# Patient Record
Sex: FEMALE | URBAN - METROPOLITAN AREA
[De-identification: names, ages, dates, MRNs, and addresses within clinical notes are randomized per-mention and may not be internally consistent; named-entity substitution may affect disease eponyms.]

---

## 2020-10-15 ENCOUNTER — TELEPHONE ENCOUNTER (OUTPATIENT)
Dept: URBAN - METROPOLITAN AREA CLINIC 92 | Facility: CLINIC | Age: 70
End: 2020-10-15

## 2020-10-16 ENCOUNTER — TELEPHONE ENCOUNTER (OUTPATIENT)
Dept: URBAN - METROPOLITAN AREA CLINIC 92 | Facility: CLINIC | Age: 70
End: 2020-10-16

## 2020-11-06 ENCOUNTER — OFFICE VISIT (OUTPATIENT)
Dept: URBAN - METROPOLITAN AREA TELEHEALTH 2 | Facility: TELEHEALTH | Age: 70
End: 2020-11-06
Payer: MEDICARE

## 2020-11-06 DIAGNOSIS — D50.8 ANEMIA, DUE TO INADEQUATE IRON INTAKE: ICD-10-CM

## 2020-11-06 DIAGNOSIS — R14.0 BLOATING: ICD-10-CM

## 2020-11-06 DIAGNOSIS — D50.9 IRON DEFICIENCY ANEMIA, UNSPECIFIED IRON DEFICIENCY ANEMIA TYPE: ICD-10-CM

## 2020-11-06 PROCEDURE — 99443 PHONE E/M BY PHYS 21-30 MIN: CPT | Performed by: PHYSICIAN ASSISTANT

## 2020-11-06 RX ORDER — FAMOTIDINE/CA CARB/MAG HYDROX 10-800-165
TABLET,CHEWABLE ORAL
Qty: 0 | Refills: 0 | Status: ACTIVE | COMMUNITY
Start: 1900-01-01

## 2020-11-06 RX ORDER — POLYETHYLENE GLYCOL 400 AND PROPYLENE GLYCOL 4; 3 MG/ML; MG/ML
SOLUTION/ DROPS OPHTHALMIC
Qty: 1 | Refills: 0 | Status: ACTIVE | COMMUNITY
Start: 1900-01-01

## 2020-11-06 RX ORDER — GLUCOSAMINE SULFATE 500 MG
TABLET ORAL
Qty: 0 | Refills: 0 | Status: ACTIVE | COMMUNITY
Start: 1900-01-01

## 2020-11-06 RX ORDER — MELOXICAM 7.5 MG/1
TABLET ORAL
Qty: 0 | Refills: 0 | Status: ACTIVE | COMMUNITY
Start: 1900-01-01

## 2020-11-06 RX ORDER — SODIUM, POTASSIUM,MAG SULFATES 17.5-3.13G
177ML SOLUTION, RECONSTITUTED, ORAL ORAL
Qty: 1 | Refills: 0 | OUTPATIENT
Start: 2020-11-06

## 2020-11-06 RX ORDER — ONDANSETRON HYDROCHLORIDE 8 MG/1
1 CAPSULE TABLET, FILM COATED ORAL
Qty: 3 CAPSULE | Refills: 0 | OUTPATIENT
Start: 2020-11-06 | End: 2020-11-07

## 2020-11-06 RX ORDER — CIPROFLOXACIN HCL 250 MG
TABLET ORAL
Qty: 0 | Refills: 0 | Status: ACTIVE | COMMUNITY
Start: 1900-01-01

## 2020-11-06 RX ORDER — ONDANSETRON 4 MG/1
DISSOLVE  2 TABLETS BY ORAL ROUTE ONCE FOR 1 DAY TABLET, ORALLY DISINTEGRATING ORAL 1
Qty: 3 | Refills: 0 | Status: ACTIVE | COMMUNITY
Start: 2017-07-31

## 2020-11-06 NOTE — HPI-OTHER HISTORIES
69yoF last seen 9/2019 for eval of bloating X several months now. The bloating occurs after eating and has  improved with align, but still present and "frustrating". Also improved with FODMAP. She has BM daily without hematochezia or melena. She has been having frequent BMs 6+day X years, neg microscopic colitis in the past.  No NSAIDs. No hearturn, regurg or dysphagia.  Pelvic u/s last year WNL Colonoscopy 2018 with one hyperplastic polyp EGD 9/2019 1cm HH gastritis without HP and small benign gastric polyp. celiac neg in the past Recently saw PCP and told she has SHILPA: ferritin 9.6, Fe+ 31 H/H 10.8/35.7

## 2020-12-22 ENCOUNTER — TELEPHONE ENCOUNTER (OUTPATIENT)
Dept: URBAN - METROPOLITAN AREA CLINIC 92 | Facility: CLINIC | Age: 70
End: 2020-12-22

## 2020-12-23 ENCOUNTER — OFFICE VISIT (OUTPATIENT)
Dept: URBAN - METROPOLITAN AREA TELEHEALTH 2 | Facility: TELEHEALTH | Age: 70
End: 2020-12-23
Payer: MEDICARE

## 2020-12-23 DIAGNOSIS — R14.0 BLOATING: ICD-10-CM

## 2020-12-23 DIAGNOSIS — D50.9 IRON DEFICIENCY ANEMIA, UNSPECIFIED IRON DEFICIENCY ANEMIA TYPE: ICD-10-CM

## 2020-12-23 DIAGNOSIS — D50.8 ANEMIA, DUE TO INADEQUATE IRON INTAKE: ICD-10-CM

## 2020-12-23 PROCEDURE — 99442 PHONE E/M BY PHYS 11-20 MIN: CPT | Performed by: PHYSICIAN ASSISTANT

## 2020-12-23 RX ORDER — FERROUS SULFATE TAB EC 325 MG (65 MG FE EQUIVALENT) 325 (65 FE) MG
1 TABLET TABLET DELAYED RESPONSE ORAL ONCE A DAY
Qty: 90 | Refills: 3 | OUTPATIENT
Start: 2020-12-23

## 2020-12-23 RX ORDER — GLUCOSAMINE SULFATE 500 MG
TABLET ORAL
Qty: 0 | Refills: 0 | Status: ACTIVE | COMMUNITY
Start: 1900-01-01

## 2020-12-23 RX ORDER — ONDANSETRON 4 MG/1
DISSOLVE  2 TABLETS BY ORAL ROUTE ONCE FOR 1 DAY TABLET, ORALLY DISINTEGRATING ORAL 1
Qty: 3 | Refills: 0 | Status: ACTIVE | COMMUNITY
Start: 2017-07-31

## 2020-12-23 RX ORDER — MELOXICAM 7.5 MG/1
TABLET ORAL
Qty: 0 | Refills: 0 | COMMUNITY
Start: 1900-01-01

## 2020-12-23 RX ORDER — CIPROFLOXACIN HCL 250 MG
TABLET ORAL
Qty: 0 | Refills: 0 | Status: ACTIVE | COMMUNITY
Start: 1900-01-01

## 2020-12-23 RX ORDER — FAMOTIDINE/CA CARB/MAG HYDROX 10-800-165
TABLET,CHEWABLE ORAL
Qty: 0 | Refills: 0 | Status: ACTIVE | COMMUNITY
Start: 1900-01-01

## 2020-12-23 RX ORDER — POLYETHYLENE GLYCOL 400 AND PROPYLENE GLYCOL 4; 3 MG/ML; MG/ML
SOLUTION/ DROPS OPHTHALMIC
Qty: 1 | Refills: 0 | Status: ACTIVE | COMMUNITY
Start: 1900-01-01

## 2020-12-23 NOTE — HPI-OTHER HISTORIES
70yoF seen in 2019 for eval of bloating X several months and more recently for SHILPA. Recently saw PCP in Oct and told she has SHILPA: Labs in Oct ferritin 9.6, Fe+ 31 H/H 10.8/35.7. Repeat 12/2020 Hemoglobin 11.1, Ferritin 8. Not on Fe+.  Cotinues to note bloating, worse after eating and has improved with align, but still present and "frustrating". Also improved with FODMAP. She has BM daily without hematochezia or melena. Her baseline is actually BMs 6+day X years, neg microscopic colitis in the past. She is going to do SIBO test at time of her procedures.  No NSAIDs. No hearturn, regurg or dysphagia.  Pelvic u/s last year WNL Colonoscopy 2018 with one hyperplastic polyp EGD 9/2019 1cm HH gastritis without HP and small benign gastric polyp. celiac neg in the past  Colon/EGD scheduled for Feb

## 2021-01-20 ENCOUNTER — TELEPHONE ENCOUNTER (OUTPATIENT)
Dept: URBAN - METROPOLITAN AREA CLINIC 92 | Facility: CLINIC | Age: 71
End: 2021-01-20

## 2021-02-19 ENCOUNTER — OFFICE VISIT (OUTPATIENT)
Dept: URBAN - METROPOLITAN AREA TELEHEALTH 2 | Facility: TELEHEALTH | Age: 71
End: 2021-02-19
Payer: MEDICARE

## 2021-02-19 DIAGNOSIS — D50.8 ANEMIA, DUE TO INADEQUATE IRON INTAKE: ICD-10-CM

## 2021-02-19 DIAGNOSIS — R14.0 BLOATING: ICD-10-CM

## 2021-02-19 PROCEDURE — 99442 PHONE E/M BY PHYS 11-20 MIN: CPT | Performed by: INTERNAL MEDICINE

## 2021-02-19 RX ORDER — GLUCOSAMINE SULFATE 500 MG
TABLET ORAL
Qty: 0 | Refills: 0 | COMMUNITY
Start: 1900-01-01

## 2021-02-19 RX ORDER — ONDANSETRON 4 MG/1
DISSOLVE  2 TABLETS BY ORAL ROUTE ONCE FOR 1 DAY TABLET, ORALLY DISINTEGRATING ORAL 1
Qty: 3 | Refills: 0 | COMMUNITY
Start: 2017-07-31

## 2021-02-19 RX ORDER — FAMOTIDINE/CA CARB/MAG HYDROX 10-800-165
TABLET,CHEWABLE ORAL
Qty: 0 | Refills: 0 | COMMUNITY
Start: 1900-01-01

## 2021-02-19 RX ORDER — POLYETHYLENE GLYCOL 400 AND PROPYLENE GLYCOL 4; 3 MG/ML; MG/ML
SOLUTION/ DROPS OPHTHALMIC
Qty: 1 | Refills: 0 | COMMUNITY
Start: 1900-01-01

## 2021-02-19 RX ORDER — MELOXICAM 7.5 MG/1
TABLET ORAL
Qty: 0 | Refills: 0 | COMMUNITY
Start: 1900-01-01

## 2021-02-19 RX ORDER — CIPROFLOXACIN HCL 250 MG
TABLET ORAL
Qty: 0 | Refills: 0 | COMMUNITY
Start: 1900-01-01

## 2021-02-19 RX ORDER — FERROUS SULFATE TAB EC 325 MG (65 MG FE EQUIVALENT) 325 (65 FE) MG
1 TABLET TABLET DELAYED RESPONSE ORAL ONCE A DAY
Qty: 90 | Refills: 3 | COMMUNITY
Start: 2020-12-23

## 2021-02-19 NOTE — HPI-OTHER HISTORIES
70yoF seen in 2019 for eval of bloating X several months and more recently for SHILPA.  Recently saw PCP in Oct and told she has SHILPA: Labs in Oct ferritin 9.6, Fe+ 31 H/H 10.8/35.7. Repeat 12/2020 Hemoglobin 11.1, Ferritin 8. Not on Fe+.      Cotinues to note bloating, worse after eating and has improved with align, but still present and "frustrating". Also improved with FODMAP. She has BM daily without hematochezia or melena. Her baseline is actually BMs 6+day X years, neg microscopic colitis in the past. Feels incomplete evacuation with bowels. She is going to do SIBO test at time of her procedures.  No NSAIDs. No hearturn, regurg or dysphagia.   Pelvic u/s last year WNL Colonoscopy 2018 with one hyperplastic polyp + IH EGD 9/2019 1cm HH gastritis without HP and small benign gastric polyp. celiac neg in the past Colon/EGD scheduled for next month--reviewed all her questions re prep and nausea--given zofran for pre-prep Brother with "blood vessels" that had to be cauterzied

## 2021-02-27 ENCOUNTER — ERX REFILL RESPONSE (OUTPATIENT)
Age: 71
End: 2021-02-27

## 2021-02-27 RX ORDER — ONDANSETRON 4 MG/1
PLEASE SPECIFY DIRECTIONS, REFILLS AND QUANTITY TABLET, ORALLY DISINTEGRATING ORAL
Qty: 1 TABLET | Refills: 0 | OUTPATIENT

## 2021-02-27 RX ORDER — ONDANSETRON 4 MG/1
TAKE 1 TABLET BY MOUTH AS NEEDED 1 HOURS BEFORE PREP TABLET, FILM COATED ORAL
Qty: 10 | Refills: 0 | OUTPATIENT

## 2021-03-04 ENCOUNTER — CLAIMS CREATED FROM THE CLAIM WINDOW (OUTPATIENT)
Dept: URBAN - METROPOLITAN AREA CLINIC 4 | Facility: CLINIC | Age: 71
End: 2021-03-04
Payer: MEDICARE

## 2021-03-04 ENCOUNTER — OFFICE VISIT (OUTPATIENT)
Dept: URBAN - METROPOLITAN AREA SURGERY CENTER 16 | Facility: SURGERY CENTER | Age: 71
End: 2021-03-04
Payer: MEDICARE

## 2021-03-04 DIAGNOSIS — K31.7 BENIGN GASTRIC POLYP: ICD-10-CM

## 2021-03-04 DIAGNOSIS — K31.7 POLYP OF STOMACH AND DUODENUM: ICD-10-CM

## 2021-03-04 DIAGNOSIS — D12.0 ADENOMA OF CECUM: ICD-10-CM

## 2021-03-04 DIAGNOSIS — D12.0 BENIGN NEOPLASM OF CECUM: ICD-10-CM

## 2021-03-04 DIAGNOSIS — D12.2 BENIGN NEOPLASM OF ASCENDING COLON: ICD-10-CM

## 2021-03-04 DIAGNOSIS — K31.89 DILATION OF STOMACH: ICD-10-CM

## 2021-03-04 DIAGNOSIS — D12.2 ADENOMA OF ASCENDING COLON: ICD-10-CM

## 2021-03-04 DIAGNOSIS — D50.9 ANEMIA, IRON DEFICIENCY: ICD-10-CM

## 2021-03-04 PROCEDURE — G8907 PT DOC NO EVENTS ON DISCHARG: HCPCS | Performed by: INTERNAL MEDICINE

## 2021-03-04 PROCEDURE — 45380 COLONOSCOPY AND BIOPSY: CPT | Performed by: INTERNAL MEDICINE

## 2021-03-04 PROCEDURE — 45385 COLONOSCOPY W/LESION REMOVAL: CPT | Performed by: INTERNAL MEDICINE

## 2021-03-04 PROCEDURE — 43239 EGD BIOPSY SINGLE/MULTIPLE: CPT | Performed by: INTERNAL MEDICINE

## 2021-03-04 PROCEDURE — 88305 TISSUE EXAM BY PATHOLOGIST: CPT | Performed by: PATHOLOGY

## 2021-03-04 PROCEDURE — 88312 SPECIAL STAINS GROUP 1: CPT | Performed by: PATHOLOGY

## 2021-03-04 RX ORDER — GLUCOSAMINE SULFATE 500 MG
TABLET ORAL
Qty: 0 | Refills: 0 | COMMUNITY
Start: 1900-01-01

## 2021-03-04 RX ORDER — CIPROFLOXACIN HCL 250 MG
TABLET ORAL
Qty: 0 | Refills: 0 | COMMUNITY
Start: 1900-01-01

## 2021-03-04 RX ORDER — FAMOTIDINE/CA CARB/MAG HYDROX 10-800-165
TABLET,CHEWABLE ORAL
Qty: 0 | Refills: 0 | COMMUNITY
Start: 1900-01-01

## 2021-03-04 RX ORDER — POLYETHYLENE GLYCOL 400 AND PROPYLENE GLYCOL 4; 3 MG/ML; MG/ML
SOLUTION/ DROPS OPHTHALMIC
Qty: 1 | Refills: 0 | COMMUNITY
Start: 1900-01-01

## 2021-03-04 RX ORDER — MELOXICAM 7.5 MG/1
TABLET ORAL
Qty: 0 | Refills: 0 | COMMUNITY
Start: 1900-01-01

## 2021-03-04 RX ORDER — FERROUS SULFATE TAB EC 325 MG (65 MG FE EQUIVALENT) 325 (65 FE) MG
1 TABLET TABLET DELAYED RESPONSE ORAL ONCE A DAY
Qty: 90 | Refills: 3 | COMMUNITY
Start: 2020-12-23

## 2021-03-04 RX ORDER — ONDANSETRON 4 MG/1
PLEASE SPECIFY DIRECTIONS, REFILLS AND QUANTITY TABLET, ORALLY DISINTEGRATING ORAL
Qty: 1 TABLET | Refills: 0 | Status: ACTIVE | COMMUNITY

## 2021-03-29 ENCOUNTER — TELEPHONE ENCOUNTER (OUTPATIENT)
Dept: URBAN - METROPOLITAN AREA CLINIC 92 | Facility: CLINIC | Age: 71
End: 2021-03-29

## 2021-04-01 ENCOUNTER — OFFICE VISIT (OUTPATIENT)
Dept: URBAN - METROPOLITAN AREA CLINIC 92 | Facility: CLINIC | Age: 71
End: 2021-04-01
Payer: MEDICARE

## 2021-04-01 VITALS — HEIGHT: 65 IN | BODY MASS INDEX: 26.33 KG/M2 | WEIGHT: 158 LBS

## 2021-04-01 DIAGNOSIS — R14.0 BLOATING: ICD-10-CM

## 2021-04-01 DIAGNOSIS — D50.9 IRON DEFICIENCY ANEMIA, UNSPECIFIED IRON DEFICIENCY ANEMIA TYPE: ICD-10-CM

## 2021-04-01 DIAGNOSIS — Z86.010 HISTORY OF COLON POLYPS: ICD-10-CM

## 2021-04-01 DIAGNOSIS — K57.90 DIVERTICULOSIS: ICD-10-CM

## 2021-04-01 PROCEDURE — 99442 PHONE E/M BY PHYS 11-20 MIN: CPT | Performed by: PHYSICIAN ASSISTANT

## 2021-04-01 RX ORDER — POLYETHYLENE GLYCOL 400 AND PROPYLENE GLYCOL 4; 3 MG/ML; MG/ML
SOLUTION/ DROPS OPHTHALMIC
Qty: 1 | Refills: 0 | COMMUNITY
Start: 1900-01-01

## 2021-04-01 RX ORDER — ONDANSETRON 4 MG/1
PLEASE SPECIFY DIRECTIONS, REFILLS AND QUANTITY TABLET, ORALLY DISINTEGRATING ORAL
Qty: 1 TABLET | Refills: 0 | Status: ACTIVE | COMMUNITY

## 2021-04-01 RX ORDER — CIPROFLOXACIN HCL 250 MG
TABLET ORAL
Qty: 0 | Refills: 0 | COMMUNITY
Start: 1900-01-01

## 2021-04-01 RX ORDER — FERROUS SULFATE TAB EC 325 MG (65 MG FE EQUIVALENT) 325 (65 FE) MG
1 TABLET TABLET DELAYED RESPONSE ORAL ONCE A DAY
Qty: 90 | Refills: 3 | COMMUNITY
Start: 2020-12-23

## 2021-04-01 RX ORDER — GLUCOSAMINE SULFATE 500 MG
TABLET ORAL
Qty: 0 | Refills: 0 | COMMUNITY
Start: 1900-01-01

## 2021-04-01 RX ORDER — FAMOTIDINE/CA CARB/MAG HYDROX 10-800-165
TABLET,CHEWABLE ORAL
Qty: 0 | Refills: 0 | COMMUNITY
Start: 1900-01-01

## 2021-04-01 RX ORDER — MELOXICAM 7.5 MG/1
TABLET ORAL
Qty: 0 | Refills: 0 | COMMUNITY
Start: 1900-01-01

## 2021-04-01 NOTE — HPI-OTHER HISTORIES
70yoF seen in 2019 for eval of bloating X several months and more recently for SHILPA.  Recently saw PCP in Oct and told she has SHILPA: Labs in Oct ferritin 9.6, Fe+ 31 H/H 10.8/35.7. Repeat 12/2020 Hemoglobin 11.1, Ferritin 8. Not on Fe+.        Cotinues to note bloating, worse after eating and has improved with align, but still present and "frustrating". Also improved with FODMAP. She has BM daily without hematochezia or melena. Her baseline is actually BMs 6+day X years, neg microscopic colitis in the past. Feels incomplete evacuation with bowels. No NSAIDs. No hearturn, regurg or dysphagia since being on FODMAP Pelvic u/s last year WNL Colonoscopy 2018 with one hyperplastic polyp + IH EGD 9/2019 1cm HH gastritis without HP and small benign gastric polyp. celiac neg in the past EGD 3/4/2021 3mm gastric fundic gland polyp and gastritis, bx neg HP and celiac Colon same day 3mm cecal adenoma and 3mm ascending colon adenoma as well as L sided diverticulosis and IH SIBO test pending She has repeat labs pending with PCP in 2 weeks Brother with "blood vessels" that had to be cauterzied

## 2021-04-27 ENCOUNTER — OFFICE VISIT (OUTPATIENT)
Dept: URBAN - METROPOLITAN AREA TELEHEALTH 2 | Facility: TELEHEALTH | Age: 71
End: 2021-04-27

## 2021-06-25 ENCOUNTER — TELEPHONE ENCOUNTER (OUTPATIENT)
Dept: URBAN - METROPOLITAN AREA CLINIC 92 | Facility: CLINIC | Age: 71
End: 2021-06-25

## 2021-08-26 ENCOUNTER — TELEPHONE ENCOUNTER (OUTPATIENT)
Dept: URBAN - METROPOLITAN AREA CLINIC 92 | Facility: CLINIC | Age: 71
End: 2021-08-26

## 2021-09-15 ENCOUNTER — OFFICE VISIT (OUTPATIENT)
Dept: URBAN - METROPOLITAN AREA CLINIC 91 | Facility: CLINIC | Age: 71
End: 2021-09-15

## 2021-09-15 ENCOUNTER — CLAIMS CREATED FROM THE CLAIM WINDOW (OUTPATIENT)
Dept: URBAN - METROPOLITAN AREA CLINIC 91 | Facility: CLINIC | Age: 71
End: 2021-09-15
Payer: MEDICARE

## 2021-09-15 ENCOUNTER — CLAIMS CREATED FROM THE CLAIM WINDOW (OUTPATIENT)
Dept: URBAN - METROPOLITAN AREA CLINIC 91 | Facility: CLINIC | Age: 71
End: 2021-09-15

## 2021-09-15 DIAGNOSIS — D50.9 ANEMIA: ICD-10-CM

## 2021-09-15 PROCEDURE — 91110 GI TRC IMG INTRAL ESOPH-ILE: CPT | Performed by: INTERNAL MEDICINE

## 2021-09-22 ENCOUNTER — TELEPHONE ENCOUNTER (OUTPATIENT)
Dept: URBAN - METROPOLITAN AREA CLINIC 40 | Facility: CLINIC | Age: 71
End: 2021-09-22

## 2021-09-22 ENCOUNTER — OFFICE VISIT (OUTPATIENT)
Dept: URBAN - METROPOLITAN AREA TELEHEALTH 2 | Facility: TELEHEALTH | Age: 71
End: 2021-09-22
Payer: MEDICARE

## 2021-09-22 DIAGNOSIS — K57.30 COLON, DIVERTICULOSIS: ICD-10-CM

## 2021-09-22 DIAGNOSIS — R14.0 BLOATING: ICD-10-CM

## 2021-09-22 DIAGNOSIS — Z86.010 HISTORY OF COLON POLYPS: ICD-10-CM

## 2021-09-22 DIAGNOSIS — D50.8 OTHER IRON DEFICIENCY ANEMIAS: ICD-10-CM

## 2021-09-22 PROBLEM — 428283002: Status: ACTIVE | Noted: 2021-03-31

## 2021-09-22 PROBLEM — 116289008: Status: ACTIVE | Noted: 2020-11-06

## 2021-09-22 PROBLEM — 397881000: Status: ACTIVE | Noted: 2021-03-31

## 2021-09-22 PROCEDURE — 99442 PHONE E/M BY PHYS 11-20 MIN: CPT | Performed by: PHYSICIAN ASSISTANT

## 2021-09-22 RX ORDER — POLYETHYLENE GLYCOL 400 AND PROPYLENE GLYCOL 4; 3 MG/ML; MG/ML
SOLUTION/ DROPS OPHTHALMIC
Qty: 1 | Refills: 0 | COMMUNITY
Start: 1900-01-01

## 2021-09-22 RX ORDER — FAMOTIDINE/CA CARB/MAG HYDROX 10-800-165
TABLET,CHEWABLE ORAL
Qty: 0 | Refills: 0 | COMMUNITY
Start: 1900-01-01

## 2021-09-22 RX ORDER — ONDANSETRON 4 MG/1
PLEASE SPECIFY DIRECTIONS, REFILLS AND QUANTITY TABLET, ORALLY DISINTEGRATING ORAL
Qty: 1 TABLET | Refills: 0 | Status: ACTIVE | COMMUNITY

## 2021-09-22 RX ORDER — CIPROFLOXACIN HCL 250 MG
TABLET ORAL
Qty: 0 | Refills: 0 | COMMUNITY
Start: 1900-01-01

## 2021-09-22 RX ORDER — GLUCOSAMINE SULFATE 500 MG
TABLET ORAL
Qty: 0 | Refills: 0 | COMMUNITY
Start: 1900-01-01

## 2021-09-22 RX ORDER — FERROUS SULFATE TAB EC 325 MG (65 MG FE EQUIVALENT) 325 (65 FE) MG
1 TABLET TABLET DELAYED RESPONSE ORAL ONCE A DAY
Qty: 90 | Refills: 3 | COMMUNITY
Start: 2020-12-23

## 2021-09-23 ENCOUNTER — TELEPHONE ENCOUNTER (OUTPATIENT)
Dept: URBAN - METROPOLITAN AREA CLINIC 40 | Facility: CLINIC | Age: 71
End: 2021-09-23

## 2021-09-27 ENCOUNTER — TELEPHONE ENCOUNTER (OUTPATIENT)
Dept: URBAN - METROPOLITAN AREA CLINIC 92 | Facility: CLINIC | Age: 71
End: 2021-09-27

## 2021-10-06 ENCOUNTER — TELEPHONE ENCOUNTER (OUTPATIENT)
Dept: URBAN - METROPOLITAN AREA SURGERY CENTER 30 | Facility: SURGERY CENTER | Age: 71
End: 2021-10-06

## 2021-10-29 ENCOUNTER — OFFICE VISIT (OUTPATIENT)
Dept: URBAN - METROPOLITAN AREA MEDICAL CENTER 28 | Facility: MEDICAL CENTER | Age: 71
End: 2021-10-29

## 2021-11-24 ENCOUNTER — TELEPHONE ENCOUNTER (OUTPATIENT)
Dept: URBAN - METROPOLITAN AREA SURGERY CENTER 30 | Facility: SURGERY CENTER | Age: 71
End: 2021-11-24

## 2021-12-03 ENCOUNTER — OFFICE VISIT (OUTPATIENT)
Dept: URBAN - METROPOLITAN AREA MEDICAL CENTER 28 | Facility: MEDICAL CENTER | Age: 71
End: 2021-12-03
Payer: MEDICARE

## 2021-12-03 ENCOUNTER — TELEPHONE ENCOUNTER (OUTPATIENT)
Dept: URBAN - METROPOLITAN AREA CLINIC 92 | Facility: CLINIC | Age: 71
End: 2021-12-03

## 2021-12-03 DIAGNOSIS — K55.20 ACQUIRED ARTERIOVENOUS MALFORMATION OF COLON: ICD-10-CM

## 2021-12-03 PROBLEM — 87522002: Status: ACTIVE | Noted: 2020-11-06

## 2021-12-03 PROCEDURE — 44360 SMALL BOWEL ENDOSCOPY: CPT | Performed by: INTERNAL MEDICINE

## 2021-12-03 RX ORDER — GLUCOSAMINE SULFATE 500 MG
TABLET ORAL
Qty: 0 | Refills: 0 | COMMUNITY
Start: 1900-01-01

## 2021-12-03 RX ORDER — CIPROFLOXACIN HCL 250 MG
TABLET ORAL
Qty: 0 | Refills: 0 | COMMUNITY
Start: 1900-01-01

## 2021-12-03 RX ORDER — ONDANSETRON 4 MG/1
PLEASE SPECIFY DIRECTIONS, REFILLS AND QUANTITY TABLET, ORALLY DISINTEGRATING ORAL
Qty: 1 TABLET | Refills: 0 | Status: ACTIVE | COMMUNITY

## 2021-12-03 RX ORDER — POLYETHYLENE GLYCOL 400 AND PROPYLENE GLYCOL 4; 3 MG/ML; MG/ML
SOLUTION/ DROPS OPHTHALMIC
Qty: 1 | Refills: 0 | COMMUNITY
Start: 1900-01-01

## 2021-12-03 RX ORDER — FERROUS SULFATE TAB EC 325 MG (65 MG FE EQUIVALENT) 325 (65 FE) MG
1 TABLET TABLET DELAYED RESPONSE ORAL ONCE A DAY
Qty: 90 | Refills: 3 | COMMUNITY
Start: 2020-12-23

## 2021-12-03 RX ORDER — FAMOTIDINE/CA CARB/MAG HYDROX 10-800-165
TABLET,CHEWABLE ORAL
Qty: 0 | Refills: 0 | COMMUNITY
Start: 1900-01-01

## 2021-12-03 RX ORDER — SODIUM PICOSULFATE, MAGNESIUM OXIDE, AND ANHYDROUS CITRIC ACID 10; 3.5; 12 MG/160ML; G/160ML; G/160ML
160 ML LIQUID ORAL
Qty: 320 MILLILITER | Refills: 0 | OUTPATIENT
Start: 2021-12-03 | End: 2021-12-04

## 2023-01-27 ENCOUNTER — TELEPHONE ENCOUNTER (OUTPATIENT)
Dept: URBAN - METROPOLITAN AREA CLINIC 23 | Facility: CLINIC | Age: 73
End: 2023-01-27

## 2024-01-11 ENCOUNTER — TELEPHONE ENCOUNTER (OUTPATIENT)
Dept: URBAN - METROPOLITAN AREA CLINIC 92 | Facility: CLINIC | Age: 74
End: 2024-01-11

## 2024-01-11 ENCOUNTER — OFFICE VISIT (OUTPATIENT)
Dept: URBAN - METROPOLITAN AREA CLINIC 92 | Facility: CLINIC | Age: 74
End: 2024-01-11
Payer: MEDICARE

## 2024-01-11 ENCOUNTER — DASHBOARD ENCOUNTERS (OUTPATIENT)
Age: 74
End: 2024-01-11

## 2024-01-11 VITALS
HEART RATE: 100 BPM | SYSTOLIC BLOOD PRESSURE: 118 MMHG | HEIGHT: 65 IN | DIASTOLIC BLOOD PRESSURE: 66 MMHG | TEMPERATURE: 97 F | BODY MASS INDEX: 28.92 KG/M2 | WEIGHT: 173.6 LBS

## 2024-01-11 DIAGNOSIS — K57.90 DIVERTICULOSIS: ICD-10-CM

## 2024-01-11 DIAGNOSIS — Z86.010 HISTORY OF COLON POLYPS: ICD-10-CM

## 2024-01-11 DIAGNOSIS — K21.9 CHRONIC GERD: ICD-10-CM

## 2024-01-11 DIAGNOSIS — D50.9 IRON DEFICIENCY ANEMIA, UNSPECIFIED IRON DEFICIENCY ANEMIA TYPE: ICD-10-CM

## 2024-01-11 DIAGNOSIS — R14.0 BLOATING: ICD-10-CM

## 2024-01-11 PROBLEM — 235595009: Status: ACTIVE | Noted: 2024-01-11

## 2024-01-11 PROCEDURE — 99214 OFFICE O/P EST MOD 30 MIN: CPT | Performed by: INTERNAL MEDICINE

## 2024-01-11 RX ORDER — ACETAMINOPHEN 325 MG
1 TABLET AS NEEDED TABLET ORAL
Status: ACTIVE | COMMUNITY

## 2024-01-11 RX ORDER — SODIUM, POTASSIUM,MAG SULFATES 17.5-3.13G
177 ML SOLUTION, RECONSTITUTED, ORAL ORAL ONCE
Qty: 1 KIT | OUTPATIENT
Start: 2024-01-12 | End: 2024-01-13

## 2024-01-11 RX ORDER — DENOSUMAB 60 MG/ML
AS DIRECTED INJECTION SUBCUTANEOUS
Status: ACTIVE | COMMUNITY

## 2024-01-11 RX ORDER — FEXOFENADINE HYDROCHLORIDE 60 MG/1
1 TABLET TABLET, FILM COATED ORAL TWICE A DAY
Status: ACTIVE | COMMUNITY

## 2024-01-11 RX ORDER — ONDANSETRON 4 MG/1
PLEASE SPECIFY DIRECTIONS, REFILLS AND QUANTITY TABLET, ORALLY DISINTEGRATING ORAL
Qty: 1 TABLET | Refills: 0 | Status: ACTIVE | COMMUNITY

## 2024-01-11 RX ORDER — FAMOTIDINE/CA CARB/MAG HYDROX 10-800-165
TABLET,CHEWABLE ORAL
Qty: 0 | Refills: 0 | COMMUNITY
Start: 1900-01-01

## 2024-01-11 RX ORDER — ONDANSETRON 4 MG/1
1 TABLET ON THE TONGUE AND ALLOW TO DISSOLVE TABLET, ORALLY DISINTEGRATING ORAL ONCE A DAY
Qty: 6 TABLET | OUTPATIENT
Start: 2024-01-12

## 2024-01-11 RX ORDER — GLUCOSAMINE SULFATE 500 MG
TABLET ORAL
Qty: 0 | Refills: 0 | Status: ACTIVE | COMMUNITY
Start: 1900-01-01

## 2024-01-11 RX ORDER — BIFIDOBACTERIUM LONGUM SUBSP. INFANTIS, AVOBENZONE, HOMOSALATE, OCTISALATE, OCTOCRYLENE, AND OXYBENZONE
AS DIRECTED KIT
Status: ACTIVE | COMMUNITY

## 2024-01-11 RX ORDER — FAMOTIDINE 10 MG/1
1 TABLET AS NEEDED TABLET, FILM COATED ORAL TWICE A DAY
Status: ACTIVE | COMMUNITY

## 2024-01-11 RX ORDER — POLYETHYLENE GLYCOL 400 AND PROPYLENE GLYCOL 4; 3 MG/ML; MG/ML
SOLUTION/ DROPS OPHTHALMIC
Qty: 1 | Refills: 0 | COMMUNITY
Start: 1900-01-01

## 2024-01-11 RX ORDER — CIPROFLOXACIN HCL 250 MG
TABLET ORAL
Qty: 0 | Refills: 0 | COMMUNITY
Start: 1900-01-01

## 2024-01-11 RX ORDER — FERROUS SULFATE TAB EC 325 MG (65 MG FE EQUIVALENT) 325 (65 FE) MG
1 TABLET TABLET DELAYED RESPONSE ORAL ONCE A DAY
Qty: 90 | Refills: 3 | COMMUNITY
Start: 2020-12-23

## 2024-01-11 RX ORDER — MULTIVITAMIN WITH IRON
1 TABLET WITH A MEAL TABLET ORAL ONCE A DAY
Status: ACTIVE | COMMUNITY

## 2024-01-11 NOTE — HPI-TODAY'S VISIT:
This is a 73-year-old female presents today for follow-up.  Her primary concern is continued reflux.  She has mild heartburn and cough after meals.  This all began after a vaccination.  She does have improvement with Pepcid which she takes once a day.  She is interested to know if she can take this more than once a day.  She was previously on a PPI although this was discontinued after she was found to have a gastric polyp.  She reports that her most recent hemoglobins have been stable in the 11-12 range

## 2024-03-04 NOTE — HPI-OTHER HISTORIES
70yoF seen in 2019 for eval of bloating X several months and more recently for SHILPA.   She saw her PCP in Oct and was told she has SHILPA: Labs in Oct ferritin 9.6, Fe+ 31 H/H 10.8/35.7. Repeat 12/2020 Hemoglobin 11.1, Ferritin 8. Reports repeat in July showed Fe+ levels improved.          Cotinues to note bloating, worse after eating but has improved with align and w/ FODMAP. She has BM daily without hematochezia or melena. No NSAIDs. No hearturn, regurg or dysphagia. Pelvic u/s last year WNL Colonoscopy 2018 with one hyperplastic polyp + IH EGD 9/2019 1cm HH gastritis without HP and small benign gastric polyp. celiac neg in the past EGD 3/4/2021 3mm gastric fundic gland polyp and gastritis, bx neg HP and celiac Colon same day 3mm cecal adenoma and 3mm ascending colon adenoma as well as L sided diverticulosis and IH Capsule endoscopy 9/15/2021: mutliple angioectasia scattered in the proximal, mid, distal small bowel. Has not seen capsule pass  Brother with "blood vessels" that had to be cauterzied
no...